# Patient Record
Sex: MALE | Race: WHITE | Employment: FULL TIME | URBAN - METROPOLITAN AREA
[De-identification: names, ages, dates, MRNs, and addresses within clinical notes are randomized per-mention and may not be internally consistent; named-entity substitution may affect disease eponyms.]

---

## 2017-07-18 ENCOUNTER — HOSPITAL ENCOUNTER (EMERGENCY)
Age: 32
Discharge: HOME | End: 2017-07-18
Attending: EMERGENCY MEDICINE
Payer: SELF-PAY

## 2017-07-18 VITALS
HEIGHT: 69 IN | HEART RATE: 57 BPM | SYSTOLIC BLOOD PRESSURE: 116 MMHG | TEMPERATURE: 98 F | BODY MASS INDEX: 21.48 KG/M2 | WEIGHT: 145 LBS | RESPIRATION RATE: 16 BRPM | DIASTOLIC BLOOD PRESSURE: 66 MMHG | OXYGEN SATURATION: 99 %

## 2017-07-18 DIAGNOSIS — B02.9 HERPES ZOSTER WITHOUT COMPLICATION: Primary | ICD-10-CM

## 2017-07-18 PROCEDURE — 99282 EMERGENCY DEPT VISIT SF MDM: CPT

## 2017-07-18 RX ORDER — PREDNISONE 10 MG/1
TABLET ORAL
Qty: 21 TAB | Refills: 0 | Status: SHIPPED | OUTPATIENT
Start: 2017-07-18

## 2017-07-18 RX ORDER — HYDROCODONE BITARTRATE AND ACETAMINOPHEN 5; 325 MG/1; MG/1
1 TABLET ORAL
Qty: 20 TAB | Refills: 0 | Status: SHIPPED | OUTPATIENT
Start: 2017-07-18

## 2017-07-18 RX ORDER — ACYCLOVIR 800 MG/1
800 TABLET ORAL
Qty: 50 TAB | Refills: 0 | Status: SHIPPED | OUTPATIENT
Start: 2017-07-18 | End: 2017-07-28

## 2017-07-19 NOTE — DISCHARGE INSTRUCTIONS

## 2017-07-19 NOTE — ED NOTES
Discharge instructions given to pt. pt verbalized understanding discharge instructions. Patient left emergency department by foot  With mom, in good condition. 3 Prescriptions given. Armband removed and shredded.

## 2017-07-19 NOTE — ED PROVIDER NOTES
[facility] [address]  EMERGENCY DEPARTMENT HISTORY AND PHYSICAL EXAM       Date: 7/18/2017   Patient Name: Sophia Sanchez   YOB: 1985  Medical Record Number: [id number]    History of Presenting Illness     Chief Complaint   Patient presents with    Rash        History Provided By:  patient    Additional History: 10:01 PM  Sophia Sanchez is a 29 y.o. male who presents to the emergency department C/O worsening right shoulder pain x 1 week. No recent injury or fall. Associated sx include rash to right shoulder onset this AM. Pt attributed the rash to using Bengay cream last night. Pt has also used Aleve, heat packs, and ice packs with no relief to pain. Denies cough, SOB, and any other sx or complaints. Primary Care Provider: None   Specialist:    Past History     Past Medical History:   History reviewed. No pertinent past medical history. Past Surgical History:   History reviewed. No pertinent surgical history. Family History:   History reviewed. No pertinent family history. Social History:   Social History   Substance Use Topics    Smoking status: Current Every Day Smoker     Packs/day: 1.00    Smokeless tobacco: None    Alcohol use Yes        Allergies:   No Known Allergies     Review of Systems   Review of Systems   Musculoskeletal: Positive for arthralgias (right shoulder pain). Skin: Positive for rash (right anterior and posterior shoulder). All other systems reviewed and are negative. Physical Exam  Vitals:    07/18/17 2041   BP: 116/66   Pulse: (!) 57   Resp: 16   Temp: 98 °F (36.7 °C)   SpO2: 99%   Weight: 65.8 kg (145 lb)   Height: 5' 9\" (1.753 m)       Physical Exam   Constitutional: He is oriented to person, place, and time. He appears well-developed. No distress. Neurological: He is alert and oriented to person, place, and time. Skin: Skin is warm and dry. He is not diaphoretic. Psychiatric: He has a normal mood and affect.  His behavior is normal.   Nursing note and vitals reviewed. Diagnostic Study Results     Labs -    No results found for this or any previous visit (from the past 12 hour(s)). Radiologic Studies -  The following have been ordered and reviewed:  No orders to display           Medical Decision Making   I am the first provider for this patient. I reviewed the vital signs, available nursing notes, past medical history, past surgical history, family history and social history. Vital Signs-Reviewed the patient's vital signs. No data found. Pulse Oximetry Analysis - Normal 99% on room air     Old Medical Records: Nursing notes. Procedures:   Procedures    ED Course:  10:01 PM  Initial assessment performed. The patients presenting problems have been discussed, and they are in agreement with the care plan formulated and outlined with them. I have encouraged them to ask questions as they arise throughout their visit. Medications Given in the ED:  Medications - No data to display    Discharge Note:  10:24 PM  Pt has been reexamined. Patient has no new complaints, changes, or physical findings. Care plan outlined and precautions discussed. Results were reviewed with the patient. All medications were reviewed with the patient; will d/c home with Acyclovir, Norco, and prednisone. All of pt's questions and concerns were addressed. Patient was instructed and agrees to follow up with [address] [place] [address], as well as to return to the ED upon further deterioration. Patient is ready to go home. Diagnosis   Clinical Impression:   1.  Herpes zoster without complication           Follow-up Information     Follow up With Details Comments Contact Info    [facility] Go to As needed for PCP follow up [id number] [facility], [address] [facility] [address]    [facility] EMERGENCY DE[facility]  As needed, If symptoms worsen [address] [address] [phone]          Discharge Medication List as of 7/18/2017 10:24 PM      START taking these medications    Details   acyclovir (ZOVIRAX) 800 mg tablet Take 1 Tab by mouth five (5) times daily for 10 days. , Normal, Disp-50 Tab, R-0      predniSONE (STERAPRED DS) 10 mg dose pack Use per pack instructions. , Normal, Disp-21 Tab, R-0      HYDROcodone-acetaminophen (NORCO) 5-325 mg per tablet Take 1 Tab by mouth every four (4) hours as needed for Pain. Max Daily Amount: 6 Tabs., Print, Disp-20 Tab, R-0             _______________________________   Attestations: This note is prepared by Gwendolyn Aj, acting as a Scribe for Ranjeet Gleason PA-C on 9:59 PM on 7/18/2017. Ranjeet Gleason PA-C: The scribe's documentation has been prepared under my direction and personally reviewed by me in its entirety.   _______________________________